# Patient Record
Sex: MALE | Race: WHITE | Employment: OTHER | URBAN - METROPOLITAN AREA
[De-identification: names, ages, dates, MRNs, and addresses within clinical notes are randomized per-mention and may not be internally consistent; named-entity substitution may affect disease eponyms.]

---

## 2020-06-02 ENCOUNTER — HOSPITAL ENCOUNTER (OUTPATIENT)
Dept: GENERAL RADIOLOGY | Age: 65
Discharge: HOME | End: 2020-06-02
Payer: MEDICARE

## 2020-06-02 DIAGNOSIS — M25.559 HIP PAIN: ICD-10-CM

## 2020-06-02 PROCEDURE — 73521 X-RAY EXAM HIPS BI 2 VIEWS: CPT

## 2020-12-09 ENCOUNTER — TRANSCRIBE ORDER (OUTPATIENT)
Dept: SCHEDULING | Age: 65
End: 2020-12-09

## 2020-12-09 DIAGNOSIS — N18.31 STAGE 3A CHRONIC KIDNEY DISEASE (HCC): Primary | ICD-10-CM

## 2020-12-15 ENCOUNTER — HOSPITAL ENCOUNTER (OUTPATIENT)
Dept: ULTRASOUND IMAGING | Age: 65
Discharge: HOME | End: 2020-12-15
Attending: FAMILY MEDICINE
Payer: MEDICARE

## 2020-12-15 DIAGNOSIS — N18.31 STAGE 3A CHRONIC KIDNEY DISEASE (HCC): ICD-10-CM

## 2020-12-15 PROCEDURE — 76770 US EXAM ABDO BACK WALL COMP: CPT

## 2021-06-16 ENCOUNTER — HOSPITAL ENCOUNTER (OUTPATIENT)
Dept: GENERAL RADIOLOGY | Age: 66
Discharge: HOME | End: 2021-06-16
Payer: MEDICARE

## 2021-06-16 ENCOUNTER — TRANSCRIBE ORDER (OUTPATIENT)
Dept: GENERAL RADIOLOGY | Age: 66
End: 2021-06-16

## 2021-06-16 DIAGNOSIS — R05.9 COUGH: Primary | ICD-10-CM

## 2021-06-16 DIAGNOSIS — R05.9 COUGH: ICD-10-CM

## 2021-06-16 PROCEDURE — 71046 X-RAY EXAM CHEST 2 VIEWS: CPT | Performed by: FAMILY MEDICINE

## 2022-07-22 ENCOUNTER — OFFICE VISIT (OUTPATIENT)
Dept: ENDOCRINOLOGY | Age: 67
End: 2022-07-22
Payer: MEDICARE

## 2022-07-22 VITALS
BODY MASS INDEX: 29.12 KG/M2 | HEART RATE: 62 BPM | WEIGHT: 181.2 LBS | SYSTOLIC BLOOD PRESSURE: 145 MMHG | DIASTOLIC BLOOD PRESSURE: 69 MMHG | HEIGHT: 66 IN

## 2022-07-22 DIAGNOSIS — E66.3 OVERWEIGHT (BMI 25.0-29.9): ICD-10-CM

## 2022-07-22 DIAGNOSIS — I10 PRIMARY HYPERTENSION: ICD-10-CM

## 2022-07-22 DIAGNOSIS — E11.65 TYPE 2 DIABETES MELLITUS WITH HYPERGLYCEMIA, WITHOUT LONG-TERM CURRENT USE OF INSULIN (HCC): Primary | ICD-10-CM

## 2022-07-22 DIAGNOSIS — E78.5 DYSLIPIDEMIA: ICD-10-CM

## 2022-07-22 PROCEDURE — 99204 OFFICE O/P NEW MOD 45 MIN: CPT | Performed by: GENERAL ACUTE CARE HOSPITAL

## 2022-07-22 PROCEDURE — 1123F ACP DISCUSS/DSCN MKR DOCD: CPT | Performed by: GENERAL ACUTE CARE HOSPITAL

## 2022-07-22 RX ORDER — PEN NEEDLE, DIABETIC 31 GX3/16"
NEEDLE, DISPOSABLE MISCELLANEOUS
Qty: 100 PEN NEEDLE | Refills: 3 | Status: SHIPPED | OUTPATIENT
Start: 2022-07-22 | End: 2022-10-21

## 2022-07-22 RX ORDER — ROSUVASTATIN CALCIUM 20 MG/1
20 TABLET, COATED ORAL DAILY
COMMUNITY
Start: 2022-06-06

## 2022-07-22 RX ORDER — TAMSULOSIN HYDROCHLORIDE 0.4 MG/1
CAPSULE ORAL
COMMUNITY
Start: 2022-07-13

## 2022-07-22 RX ORDER — ALLOPURINOL 300 MG/1
TABLET ORAL
COMMUNITY
Start: 2022-07-01

## 2022-07-22 RX ORDER — GUAIFENESIN 100 MG/5ML
81 LIQUID (ML) ORAL DAILY
COMMUNITY

## 2022-07-22 RX ORDER — GLIMEPIRIDE 2 MG/1
2 TABLET ORAL 2 TIMES DAILY
COMMUNITY
Start: 2022-06-16 | End: 2022-07-22 | Stop reason: ALTCHOICE

## 2022-07-22 RX ORDER — GLIPIZIDE 10 MG/1
10 TABLET ORAL 2 TIMES DAILY
Qty: 60 TABLET | Refills: 2 | Status: SHIPPED | OUTPATIENT
Start: 2022-07-22 | End: 2022-10-21 | Stop reason: SDUPTHER

## 2022-07-22 RX ORDER — LISINOPRIL 20 MG/1
40 TABLET ORAL DAILY
COMMUNITY

## 2022-07-22 RX ORDER — FENOFIBRATE 54 MG/1
TABLET ORAL
COMMUNITY
Start: 2022-07-01

## 2022-07-22 NOTE — PATIENT INSTRUCTIONS
Diabetes Education referral made: Call them for Appt  [facility] for [address] [facility], [address] [address]  Phone [phone]  Fax [phone]     Plan to repeat your diabetes eye exam in the near future. Please be sure to have the eye clinic / office fax us the report of your latest eye exam to our clinic to fax # [phone]. This is very important for us to keep track of any effect of diabetes on your vision as part of our wholesome diabetes care that we provide. INFORMATION FOR NEW DIABETES PATIENTS NOT ON INSULIN:    I would like to welcome you to our Diabetes & Endocrinology clinic. We want to do our best to help you take the best care of your diabetes. I would like for you to read this fact sheet which will have some important information for you regarding your treatment. What is my HbA1c (hemoglobin A1c) ? Blood sugar is very sticky and if left elevated for long enough, it will stick to just about everything in your body. This includes enzymes which can no longer function properly and the lining of your blood vessels which can get damaged and result in damaged organs. It also sticks to your hemoglobin when your red blood cells are being produced and measuring this can provide us with a good idea of what your blood sugar average has been over the past 3 months. Most of the time we aim for a HbA1c value of 7% but this can be different for certain people under certain circumstances. Why do I need to keep a glucose log ? It is not possible to properly make changes to your insulin dose unless we know what your glucose values are at home. Using your HbA1c we can only have a general idea of whether your glucose has been controlled or uncontrolled, but it will not inform us on your day-to-day and [id number] blood sugar control.  If you present to clinic without your glucose log, you may be wasting your visit rather than having a more meaningful visit since medication adjustments will be limited. What other things should I do to always be prepared ? Glucose tablets. Thats right, if you are on a medication that can potentially cause low blood sugars, you need to be prepared just in case since it can cause you to have very uncomfortable symptoms. Generally it is a good idea to keep some in your car, in your bedroom, and at work/school just in case. If your not sure if your diabetes medication can cause low blood sugar, be sure to ask your doctor. Diabetes ID. It is important for others to know that you are diabetic (especially if you are using in insulin) in case for some reason you are not able to communicate with others. This can happen if you pass out due to severely low blood sugar for example. IDs come as bracelets, necklaces, and dog tags. Check with your pharmacy about obtaining an ID and wear it wherever you go. I look forward to working with you,    Annie Sweeney MD   [address] [place] [address]    . ............................................................................................................................................ PLAN FOR TODAY    We will plan to make the following changes to your diabetes medications:  Plan is:  Stop Glimepride  Start Glipizid 10mg before breakfast and dinner (take 30 mins before the meal, start by taking half dose for dinner for few days and if morning sugars above 130 take full tablet before dinner)  If your sugars are running high on Glipizide 10mg twice daily and Jardiance 25mg daily then you can start Levemir insulin 10 units at bedtime. Please notify us if you are starting the insulin. It will be important to continue checking your glucose just as you did previously. I would like you at the very least to check you glucose during:     + AM fasting before breakfast   + Dinner time   + Bedtime   + Any other time that you are not feeling well. Always provide a glucose log that is completed at every visit so that we can review the results of your home glucose together. Without this, it is not possible to make accurate changes to your insulin doses. Diabetes and Meal Planning    Meal planning can be a key part of managing diabetes. Planning meals and snacks with the right balance of carbohydrate, protein, and fat can help you keep your blood sugar at the target level. You don't have to eat special foods. You can eat what your family eats, including sweets once in a while. But you do have to pay attention to how often you eat and how much you eat of certain foods. Your plate  The plate format is a simple way to help you manage how you eat. You plan meals by learning how much space each food should take on a plate. It can make it easier to keep your blood sugar level within your target range. It also helps you see if you're eating healthy portion sizes. To use the plate format, you put non-starchy vegetables on half your plate. Add lean protein foods, such as fish, lean meats and poultry, or soy products, on one-quarter of the plate. Put a grain or starchy vegetable (such as brown rice or a potato) on the final quarter of the plate. You can add a small piece of fruit and some low-fat or fat-free milk or yogurt, depending on your carbohydrate goal for each meal.  Make sure that you are not using an oversized plate. A 9-inch plate is best.    Carbohydrates  Carbohydrate raises blood sugar higher and more quickly than any other nutrient. It is found in desserts, breads and cereals, and fruit. It's also found in starchy vegetables such as potatoes and corn, grains such as rice and pasta, and milk and yogurt. You can help keep your blood sugar levels within your target range by planning how much carbohydrate to have at meals and snacks. The amount you need depends on several things.  These include your weight, how active you are, which diabetes medicines you take, and what your goals are for your blood sugar levels. An example of a carbohydrate counting plan is:  45 to 60 grams at each meal. That's about the same as 3 to 4 carbohydrate servings. 15 to 20 grams at each snack. That's about the same as 1 carbohydrate serving. The Nutrition Facts label on packaged foods tells you how much carbohydrate is in a serving of the food. First, look at the serving size on the food label. All of the nutrition information on a food label is based on that serving size. For foods that don't come with labels, such as fresh fruits and vegetables, you'll need a guide that lists carbohydrate in these foods. You may use an kate on your smart phone called [employer]. How can you plan healthy meals? Here are some tips to get started:  Plan your meals a week at a time. Don't forget to include snacks too. Use cookbooks or online recipes to plan several main meals. Plan some quick meals for busy nights. You also can double some recipes that freeze well. Then you can save half for other busy nights when you don't have time to cook. Make sure you have the ingredients you need for your recipes. If you're running low on basic items, put these items on your shopping list too. List foods that you use to make breakfasts, lunches, and snacks. List plenty of fruits and vegetables. Post this list on the refrigerator. Add to it as you think of more things you need. Take the list to the store to do your weekly shopping. Follow-up care is a key part of your treatment and safety. Be sure to make and go to all appointments, and call your doctor if you are having problems. It's also a good idea to know your test results and keep a list of the medicines you take. Food Tips    Back to basics:    When you have diabetes or pre-diabetes, as you know, your body has difficulty dealing with the sugar it absorbs from your meals.  An unrelated but very relevant term you may have heard before, quantitative easing, has a new meaning: By gradually reducing the load of sugars entering the body, you ease the stress on the body and allow it to catch up with the work it must do. This in turn will allow your body to work better. On top of this, remember one point, the more sugar stress your body has, the more you enter a state of glucose toxicity and this is a state where you become even more resistant. Thats right higher sugar = more resistance, not only to your own bodies attempt to fix the problem but also resistance to your medications. This is why medications work best when a proper diet is followed. Tip 1. Dont forget the protein. When you add a portion of meat or other low carb protein food in your meal, it provides healthy calories which contribute to reducing that feeling of hunger that drives you to eat what you dont want. Tip 2. Portions are a real thing. Before you eat, stop and look at your plate/table. Count how many items have sugars/carbs in them. A sandwich (bread) ? Zettie Brome ? Sweet drink ? Potatoe ? Pasta ? Rice ? You would be surprised when you become aware. Aim for a reasonable portion of carbs, and if you feel you absolutely cannot do this, at least start working toward this. 45-60 grams is usually more than enough in one meal. And YES, than includes the desert! Tip 3. Three meals per day, snack-free in between! Your body needs a break. Eating an adequate meal keeps you from getting hungry and reaching for a snack in between meals. Recall that most of the time, diabetic patients are not treating these snacks. Dont eat dinner late at night, but rather allow more overnight fasting time for your body to recover.  If you eat dinner at 8 PM, try 6 PM.  Sporadic eating is the opposite of what you need, and adjusting to a regular eating schedule such as this will not only be a great benefit to your body, but your medications will also tend to work better at keeping your diabetes under control. Tip 4. There is no best diet when it comes to weight loss. When comparing diets and outcomes, the main ingredient when searching for weight loss was calories ! Lower calories = better weight loss. Pick a healthy diet thats right for you, i.e. diabetes friendly, and evaluate your daily calorie intake. And of course this would be of no use without exercise. Calories in need calories out.    --------------------------------------------------------------------------------------------------------------------------------------------------------------------------------  Diabetes Dental Care  When you have diabetes, managing blood sugar levels and taking good care of your teeth and gums are both important. When blood sugar levels are high, there's a greater risk for Gum (periodontal) disease. Tooth decay. Fungal infections in the mouth, like thrush. Dry mouth. Keeping your blood sugar levels in your target range can help prevent problems with the teeth and gums. If you have any problems with your teeth or gums, it is important see your dentist.  How do you care for your teeth and gums when you have diabetes? Brush your teeth twice a day. Floss daily. Make sure to press the floss against your teeth and not your gums. Check each day for areas where your gums might be red or painful. Be sure to let your dentist know of any sores in your mouth. See your dentist regularly for professional cleaning of your teeth and to look for gum problems. Many dentists recommend getting checkups twice a year. Remind your dentist that you have diabetes before any work is done. Don't smoke or use smokeless tobacco.    --------------------------------------------------------------------------------------------------------------------------------------------------------------------------------  Diabetes Foot Care    When you have diabetes, your feet need extra care and attention.  Diabetes can damage the nerve endings and blood vessels in your feet, making you less likely to notice when your feet are injured. Diabetes also limits your body's ability to fight infection. If you get a minor foot injury, it could become an ulcer or a serious infection. With good foot care, you can prevent most of these problems. Caring for your feet can be quick and easy. Most of the care can be done when you are bathing or getting ready for bed. Keep your blood sugar close to normal by watching what and how much you eat, monitoring blood sugar, taking medicines if prescribed, and getting regular exercise. Do not smoke. Smoking affects blood flow and can make foot problems worse. Eat a diet that is low in fats. High fat intake can cause fat to build up in your blood vessels and decrease blood flow. Inspect your feet daily for blisters, cuts, cracks, or sores. If you cannot see well, use a mirror or have someone help you. Take care of your feet:  Wash your feet every day. Use warm (not hot) water. Check the water temperature with your wrists or other part of your body, not your feet. Dry your feet well. If the skin on your feet stays moist, bacteria or a fungus can grow, which can lead to infection. Use moisturizing skin cream to keep the skin on your feet soft and prevent calluses and cracks. Stop using any cream that causes a rash. Clean underneath your toenails carefully. Do not use a sharp object to clean underneath your toenails. Change socks daily. Look inside your shoes every day for things like gravel or torn linings, which could cause blisters or sores. Buy shoes that fit well but not too tightly to prevent bunions and blisters. Shoes should be flexible and breathable but prevent from injury. Do not go barefoot, especially at night, to prevent injury. Do not try to treat an early foot problem at home. Home remedies or treatments that you can buy without a prescription (such as corn removers) can be harmful.   Seek immediate help if:   You have a foot sore, an ulcer or break in the skin that is not healing after 4 days, bleeding corns or calluses, or an ingrown toenail. You have blue or black areas. You have peeling skin or tiny blisters between your toes or cracking or oozing of the skin. You have a fever for more than 24 hours and a foot sore. You have new numbness or tingling in your feet that does not go away after you move your feet or change positions. You have new unexplained or unusual swelling of the foot or ankle.

## 2022-07-22 NOTE — PROGRESS NOTES
REFERRED BY: Fabrizio Pro MD     REASON:  Uncontrolled type 2 diabetes mellitus    CHIEF COMPLAINT: evaluation of type 2 diabetes mellitus    HISTORY OF PRESENT ILLNESS:   Luke Beth is a 79 y.o. male with a PMHx as noted below who presents for evaluation of uncontrolled type 2 diabetes.     Diabetes History:  Diabetes was diagnosed: 2002 officially diagnosed,   1999 PE in the hospital and found with impaired sugars  Family History of diabetes is Positive father DM 2  Last A1c prior to initial visit was: 8.1%    Current Home Regimen:  Glimepride 2mg 2x/day started on march   Jardiance 25mg daily 6 weeks ago    Januvia stopped it many yrs ago  Glyburide was on it before it  Metformin d/c 2 yrs ago    Review of home glucose:  -133 ( the lowest during the day)  Dinner before 5-6PM >180 - 250, one reading of 330  Bedtime 120-220    Hypoglycemia:no    Diet: trying to do keto <20g/day before, 6 weeks ago started eating 40-60g/meal  -breakfast 2 black coffee cups, then 1 hr later eats eggs 2x w/ w/o meat, wheat toast, oatmeal 3x/week, occass pancakes with blueberries  -lunch roast beef sandwiches, bean soup  -dinner whatever his wife fixes, chicken tenders over salad, 2 hotdogs with no buns with french fries with apple sauce, 1 starchy veggie, broccoli,   -snacks: cheese, nuts, fresh watermelon for dessert   -michi: avg 4-5 liters of water, 2x/month soda    Physical Activity:  -sedentary lifestyle, in summer more activity      Complications:  Retinopathy:Yes  Nephropathy:Yes  Neuropathy:Yes  MI or CVA:No    Last Ophthalm:1 yr and half    Last Podiatry:none    On a Statin:Yes  On an ACEI/ARB:Yes  On Aspirin:Yes  Smoker:Yes    06/09/2022  Fasting blood glucose 193  Cr 1.53  GFR 49  03/2022  LDL 62    Chol 148  HDL 46    Review of most recent diabetes-related labs:  No results found for: HBA1C, OPZ7DIUT, DSC3THSZ, CHOL, LDLC, LDL, LDLCEXT, GFRAA, GFRNA, CREATEXT, MCACR, MALBEXT, 020582, GADLT, INSUL, CPEPLT, TSH, VITD3, B12LT, [id number], TSHEXT  Lab Key:  [id number] = IA-2 pancreatic islet cell autoantibody  CPEPL = C-peptide level  :EXT = External Lab  GADLT = RAGHU-65 autoantibody   INSUL = Insulin level  MCACR (or MALBEXT) = Urine Microalbumin (or External UM)  B12LT = B12 level    PAST MEDICAL/SURGICAL HISTORY:   No past medical history on file. No past surgical history on file. ALLERGIES:   Not on File    MEDICATIONS ON ADMISSION:     Current Outpatient Medications:     allopurinoL (ZYLOPRIM) 300 mg tablet, TAKE 1 TABLET BY MOUTH ONCE DAILY FOR 90 DAYS, Disp: , Rfl:     glimepiride (AMARYL) 2 mg tablet, Take 2 mg by mouth two (2) times a day., Disp: , Rfl:     fenofibrate (LOFIBRA) 54 mg tablet, TAKE 1 TABLET BY MOUTH ONCE DAILY, MUST GET LABS DONE FOR MORE REFILLS, Disp: , Rfl:     rosuvastatin (CRESTOR) 20 mg tablet, Take 20 mg by mouth in the morning., Disp: , Rfl:     lisinopriL (PRINIVIL, ZESTRIL) 20 mg tablet, Take  by mouth two (2) times a day., Disp: , Rfl:     MULTIVITAMIN PO, Take  by mouth., Disp: , Rfl:     aspirin 81 mg chewable tablet, Take 81 mg by mouth in the morning., Disp: , Rfl:     empagliflozin (Jardiance) 25 mg tablet, Take  by mouth daily. , Disp: , Rfl:     tamsulosin (FLOMAX) 0.4 mg capsule, TAKE 1 CAPSULE BY MOUTH TWICE DAILY 30 MINUTES AFTER A MEAL, Disp: , Rfl:     SOCIAL HISTORY:   Social History     Socioeconomic History    Marital status:      Spouse name: Not on file    Number of children: Not on file    Years of education: Not on file    Highest education level: Not on file   Occupational History    Not on file   Tobacco Use    Smoking status: Not on file     Passive exposure: Never    Smokeless tobacco: Never   Substance and Sexual Activity    Alcohol use: Yes    Drug use: Never    Sexual activity: Not on file   Other Topics Concern    Not on file   Social History Narrative    Not on file     Social Determinants of Health     Financial Resource Strain: Not on file Food Insecurity: Not on file   Transportation Needs: Not on file   Physical Activity: Not on file   Stress: Not on file   Social Connections: Not on file   Intimate Partner Violence: Not on file   Housing Stability: Not on file       FAMILY HISTORY:  No family history on file. REVIEW OF SYSTEMS: Complete ROS assessed and noted for that which is described above, all else are negative.     CONSTITUTIONAL: no fevers, chills, weight loss  EYES: no blurry vision or double vision  CARDIOVASCULAR: no chest pain or palpitations  RESPIRATORY: no cough or shortness of breath  GASTROINTESTINAL: no dysphagia or abdominal pain  MUSCULOSKELETAL: no joint pains or weakness  SKIN: no rashes  NEUROLOGICAL: no numbness, tingling, or headaches  PSYCHIATRIC: no depression or anxiety  ENDOCRINE: no heat or cold intolerance, no polyuria or polydipsia      PHYSICAL EXAMINATION:  VITAL SIGNS:  Visit Vitals  BP (!) 145/69   Pulse 62   Ht 5' 6\" (1.676 m)   Wt 181 lb 3.2 oz (82.2 kg)   BMI 29.25 kg/m²     Last 3 Recorded Weights in this Encounter    07/22/22 0931   Weight: 181 lb 3.2 oz (82.2 kg)        GENERAL: NCAT, Sitting comfortably, NAD  EYES: EOMI, non-icteric, no proptosis  Ear/Nose/Throat: NCAT, no inflammation, no masses  LYMPH NODES: No LAD  CARDIOVASCULAR: S1 S2, RRR, No murmur, 2+ radial pulses  RESPIRATORY: CTA b/l, no wheeze/rales  GASTROINTESTINAL: NT, ND  MUSCULOSKELETAL: Normal ROM, no atrophy  SKIN: warm, no edema/rash/ or other skin changes  NEUROLOGIC: 5/5 power all extremities, no tremor, AAOx3  PSYCHIATRIC: Normal affect, Normal insight and judgement    Diabetic foot exam:     Left Foot:   Visual Exam: normal    Pulse DP: 2+ (normal)   Filament test: normal sensation    Vibratory sensation: Vibratory sensation: normal       Right Foot:   Visual Exam: normal    Pulse DP: 2+ (normal)   Filament test: normal sensation    Vibratory sensation: Vibratory sensation: normal      REVIEW OF LABORATORY AND RADIOLOGY DATA: Labs and documentation have been reviewed as described above. ASSESSMENT AND PLAN:   Frandy Hassan is a 79 y.o. male with a PMHx as noted above who presents for evaluation of uncontrolled type 2 diabetes. Problems:  Type 2 diabetes Uncontrolled  Hyperlipidemia  Hypertension    We had the pleasure of reviewing together the basics of diabetes including basic pathophysiology and diabetes care. We further discussed the importance of checking home glucose regularly and takin all of their scheduled medications in order to have the best possible outcome. I was able to answer any questions they had in clinic today and they are invited to reach me if they have any further questions. Based upon our discussion together today we have decided to make the following changes: Today we spent time also discussing the goals of diabetes treatment in the elderly population. Note that avoiding hypoglycemia becomes an increasingly important goal with consideration to the increased risk of falls and associated fractures, among other accidents / trauma that can result from a low blood sugar. In this setting, customizing A1c goals is always a good idea, and consideration of the patients independence and functional status is always important when considering their diabetes regimen and the best way to approach treatment decisions today.      PLAN  Type 2 Diabetes  Medications:   Given suboptimal sugar control and a1c above target of 7%  Jardiace was recently started    Plan:  Continue Jardiance 25mg daily  Stop Glimepride  Start Glipizide 10mg before breakfast and dinner (take 30 mins before the meal, start by taking half dose for dinner for few days and if morning sugars above 130 take full tablet before dinner)  Advised if his BS running high (BS >200) on Glipizide 10mg twice daily and Jardiance 25mg daily then to start Levemir insulin 10 units at bedtime (advised not to  the prescription til he needs it)  GLP-1 agonists not option per patient due to cost    Advised to check glucose 2x/day  Provided with glucose log sheets for later review. Referred for DM educ  HTN: BP above target, advised DELUNA, and better dietary habits, no changes today, on lisinopril 20mg, jardiance will also help control BP. HLD: Fasting lipids to be reviewed, LDL 62 on rosuva 20, cont same  Overweight: discussed lifestyle modif  RTC 3 months    We discussed the expected course, resolution and complications of the diagnosis(es) in detail. Medication risks, benefits, costs, interactions, and alternatives were discussed as indicated. I advised Liliana Sifuentes to contact the office if him condition worsens, changes or fails to improve as anticipated. Patient expressed understanding with the diagnosis(es) and plan. MD Man[facility]    Please see patient instructions.

## 2022-07-22 NOTE — LETTER
7/30/2022    Patient: Esperanza Aguilar   YOB: 1955   Date of Visit: 7/22/2022     Priyank Dickson MD  [address] [place] [id number]  Via Fax: [phone]    Dear Priyank Dickson MD,      Thank you for referring Mr. Azam Mart to [address] [place] [address] for evaluation. My notes for this consultation are attached. If you have questions, please do not hesitate to call me. I look forward to following your patient along with you.       Sincerely,    Lester Perdomo MD

## 2022-07-30 PROBLEM — E11.65 TYPE 2 DIABETES MELLITUS WITH HYPERGLYCEMIA, WITHOUT LONG-TERM CURRENT USE OF INSULIN (HCC): Status: ACTIVE | Noted: 2022-07-30

## 2022-07-30 PROBLEM — E78.5 DYSLIPIDEMIA: Status: ACTIVE | Noted: 2022-07-30

## 2022-07-30 PROBLEM — I10 PRIMARY HYPERTENSION: Status: ACTIVE | Noted: 2022-07-30

## 2022-07-30 PROBLEM — E66.3 OVERWEIGHT (BMI 25.0-29.9): Status: ACTIVE | Noted: 2022-07-30

## 2022-10-17 DIAGNOSIS — E11.65 TYPE 2 DIABETES MELLITUS WITH HYPERGLYCEMIA, WITHOUT LONG-TERM CURRENT USE OF INSULIN (HCC): ICD-10-CM

## 2022-10-21 ENCOUNTER — OFFICE VISIT (OUTPATIENT)
Dept: ENDOCRINOLOGY | Age: 67
End: 2022-10-21
Payer: MEDICARE

## 2022-10-21 VITALS
DIASTOLIC BLOOD PRESSURE: 91 MMHG | WEIGHT: 175.2 LBS | SYSTOLIC BLOOD PRESSURE: 134 MMHG | BODY MASS INDEX: 28.16 KG/M2 | HEIGHT: 66 IN | HEART RATE: 78 BPM

## 2022-10-21 DIAGNOSIS — E11.65 TYPE 2 DIABETES MELLITUS WITH HYPERGLYCEMIA, WITHOUT LONG-TERM CURRENT USE OF INSULIN (HCC): ICD-10-CM

## 2022-10-21 LAB — HBA1C MFR BLD HPLC: 7.1 %

## 2022-10-21 PROCEDURE — 83036 HEMOGLOBIN GLYCOSYLATED A1C: CPT | Performed by: GENERAL ACUTE CARE HOSPITAL

## 2022-10-21 PROCEDURE — 99213 OFFICE O/P EST LOW 20 MIN: CPT | Performed by: GENERAL ACUTE CARE HOSPITAL

## 2022-10-21 PROCEDURE — 1123F ACP DISCUSS/DSCN MKR DOCD: CPT | Performed by: GENERAL ACUTE CARE HOSPITAL

## 2022-10-21 RX ORDER — GLIPIZIDE 10 MG/1
10 TABLET ORAL 2 TIMES DAILY
Qty: 180 TABLET | Refills: 3 | Status: SHIPPED | OUTPATIENT
Start: 2022-10-21

## 2022-10-21 RX ORDER — GLIPIZIDE 10 MG/1
TABLET ORAL
Qty: 180 TABLET | Refills: 3 | OUTPATIENT
Start: 2022-10-21

## 2022-10-21 NOTE — PROGRESS NOTES
REFERRED BY: Wilfredo De Paz MD     REASON:  Uncontrolled type 2 diabetes mellitus    CHIEF COMPLAINT: evaluation of type 2 diabetes mellitus    HISTORY OF PRESENT ILLNESS:   Joie Buenrostro is a 79 y.o. male with a PMHx as noted below who presents for evaluation of uncontrolled type 2 diabetes. In the last visit we stopped Glimepiride 2mg BID and started Glipizide 10mg BID and to take Jardiance 25mg daily. Patient has been monitoring his diet well and restricting himself average 40g carbs per meal, no hypoglycemia episodes.      Diabetes History:  Diabetes was diagnosed: 2002 officially diagnosed,   1999 PE in the hospital and found with impaired sugars  Family History of diabetes is Positive father DM 2  Last A1c prior to initial visit was: 8.1% 07/2022  7.1  10/21/2022    Current Home Regimen:  Glipizide 10mg BID (was taking after meals)  Jardiance 25mg daily    Januvia stopped it many yrs ago  Glyburide was on it before it  Metformin d/c 2 yrs ago    Review of home glucose:  See scanned      Hypoglycemia:no    Diet: eating 40-50g/meal  -breakfast 2 black coffee cups, then 1 hr later eats eggs 2x w/ w/o meat, wheat toast, oatmeal 3x/week, occass pancakes with blueberries  -lunch roast beef sandwiches, bean soup  -dinner whatever his wife fixes, chicken tenders over salad, 2 hotdogs with no buns with french fries with apple sauce, 1 starchy veggie, broccoli,   -snacks: cheese, nuts, fresh watermelon for dessert   -michi: avg 4-5 liters of water, 2x/month soda    Physical Activity:  -sedentary lifestyle, in summer more activity    Complications:  Retinopathy:Yes  Nephropathy:Yes  Neuropathy:Yes  MI or CVA:No    Last Ophthalm: last year    Last Podiatry:none    On a Statin:Yes  On an ACEI/ARB:Yes  On Aspirin:Yes  Smoker:No    06/09/2022  Fasting blood glucose 193  Cr 1.53  GFR 49  03/2022  LDL 62    Chol 148  HDL 46    Review of most recent diabetes-related labs:  No results found for: HBA1C, [id number], [id number], CHOL, LDLC, LDL, LDLCEXT, GFRAA, GFRNA, CREATEXT, MCACR, MALBEXT, [id number], GADLT, INSUL, CPEPLT, TSH, VITD3, B12LT, AOO0PBPS, TSHEXT, ZXP1GYEI, TSHEXT  Lab Key:  [id number] = IA-2 pancreatic islet cell autoantibody  CPEPL = C-peptide level  :EXT = External Lab  GADLT = RAGHU-65 autoantibody   INSUL = Insulin level  MCACR (or MALBEXT) = Urine Microalbumin (or External UM)  B12LT = B12 level    PAST MEDICAL/SURGICAL HISTORY:   No past medical history on file. No past surgical history on file. ALLERGIES:   Not on File    MEDICATIONS ON ADMISSION:     Current Outpatient Medications:     OTHER,NON-FORMULARY,, Prostate supplement one tab daily, Disp: , Rfl:     allopurinoL (ZYLOPRIM) 300 mg tablet, TAKE 1 TABLET BY MOUTH ONCE DAILY FOR 90 DAYS, Disp: , Rfl:     fenofibrate (LOFIBRA) 54 mg tablet, TAKE 1 TABLET BY MOUTH ONCE DAILY, MUST GET LABS DONE FOR MORE REFILLS, Disp: , Rfl:     rosuvastatin (CRESTOR) 20 mg tablet, Take 20 mg by mouth in the morning., Disp: , Rfl:     tamsulosin (FLOMAX) 0.4 mg capsule, TAKE 1 CAPSULE BY MOUTH TWICE DAILY 30 MINUTES AFTER A MEAL, Disp: , Rfl:     lisinopriL (PRINIVIL, ZESTRIL) 20 mg tablet, Take 40 mg by mouth daily. , Disp: , Rfl:     MULTIVITAMIN PO, Take  by mouth., Disp: , Rfl:     aspirin 81 mg chewable tablet, Take 81 mg by mouth in the morning., Disp: , Rfl:     glipiZIDE (GLUCOTROL) 10 mg tablet, Take 1 Tablet by mouth two (2) times a day., Disp: 60 Tablet, Rfl: 2    empagliflozin (Jardiance) 25 mg tablet, Take 1 Tablet by mouth in the morning., Disp: 90 Tablet, Rfl: 1    insulin detemir U-100 (LEVEMIR FLEXTOUCH) 100 unit/mL (3 mL) inpn, 15 Units by SubCUTAneous route nightly.  (Patient not taking: Reported on 10/21/2022), Disp: 15 mL, Rfl: 5    Insulin Needles, Disposable, (Leigh Pen Needle) 32 gauge x 5/32\" ndle, For use daily with Insulin Pen Dx E11.9 (Patient not taking: Reported on 10/21/2022), Disp: 100 Pen Needle, Rfl: 3    SOCIAL HISTORY:   Social History Socioeconomic History    Marital status:      Spouse name: Not on file    Number of children: Not on file    Years of education: Not on file    Highest education level: Not on file   Occupational History    Not on file   Tobacco Use    Smoking status: Never     Passive exposure: Never    Smokeless tobacco: Never   Substance and Sexual Activity    Alcohol use: Yes    Drug use: Never    Sexual activity: Not on file   Other Topics Concern    Not on file   Social History Narrative    Not on file     Social Determinants of Health     Financial Resource Strain: Not on file   Food Insecurity: Not on file   Transportation Needs: Not on file   Physical Activity: Not on file   Stress: Not on file   Social Connections: Not on file   Intimate Partner Violence: Not on file   Housing Stability: Not on file       FAMILY HISTORY:  No family history on file. REVIEW OF SYSTEMS: Complete ROS assessed and noted for that which is described above, all else are negative.     CONSTITUTIONAL: no fevers, chills, weight loss  EYES: no blurry vision or double vision  CARDIOVASCULAR: no chest pain or palpitations  RESPIRATORY: no cough or shortness of breath  GASTROINTESTINAL: no dysphagia or abdominal pain  MUSCULOSKELETAL: no joint pains or weakness  SKIN: no rashes  NEUROLOGICAL: no numbness, tingling, or headaches  PSYCHIATRIC: no depression or anxiety  ENDOCRINE: no heat or cold intolerance, no polyuria or polydipsia      PHYSICAL EXAMINATION:  VITAL SIGNS:  Visit Vitals  BP (!) 150/77   Pulse 78   Ht 5' 6\" (1.676 m)   Wt 175 lb 3.2 oz (79.5 kg)   BMI 28.28 kg/m²     Last 3 Recorded Weights in this Encounter    10/21/22 1127   Weight: 175 lb 3.2 oz (79.5 kg)        GENERAL: NCAT, Sitting comfortably, NAD  EYES: EOMI, non-icteric, no proptosis  Ear/Nose/Throat: NCAT, no inflammation, no masses  LYMPH NODES: No LAD  CARDIOVASCULAR: S1 S2, RRR, No murmur, 2+ radial pulses  RESPIRATORY: CTA b/l, no wheeze/rales  GASTROINTESTINAL: NT, ND  MUSCULOSKELETAL: Normal ROM, no atrophy  SKIN: warm, no edema/rash/ or other skin changes  NEUROLOGIC: 5/5 power all extremities, no tremor, AAOx3  PSYCHIATRIC: Normal affect, Normal insight and judgement    Diabetic foot exam 07/2022:     Left Foot:   Visual Exam: normal    Pulse DP: 2+ (normal)   Filament test: normal sensation    Vibratory sensation: Vibratory sensation: normal       Right Foot:   Visual Exam: normal    Pulse DP: 2+ (normal)   Filament test: normal sensation    Vibratory sensation: Vibratory sensation: normal      REVIEW OF LABORATORY AND RADIOLOGY DATA:   Labs and documentation have been reviewed as described above. ASSESSMENT AND PLAN:   Елена Berg is a 79 y.o. male with a PMHx as noted above who presents for evaluation of uncontrolled type 2 diabetes. Problems:  Type 2 diabetes Uncontrolled  Hyperlipidemia  Hypertension    We spent time today discussing preferred dietary changes and goals which will benefit their diabetes treatment. We noted the need to have an awareness of the amount of carbohydrates consumed in each meal, which includes the beverage, main course, and desert. We noted the benefits of eating 3 meals per day with appropriate portions. We also discussed the importance of getting blood sugars back down in a timely fashion following meals to reduce what is known as post-prandial hyperglycemia, and in this context we also noted the benefits of exercise/walking for 20 minutes, one hour after eating dinner each evening to help reset their blood sugar before bedtime. Patient demonstrated their understanding of these concepts.       PLAN  Type 2 Diabetes  Medications:   Given suboptimal sugar control and a1c above target of 7%  Jardiace was recently started    Plan:  Continue Jardiance 25mg daily  Continue Glipizide 10mg BID (advised to take it 30mins before breakfast and dinner)    GLP-1 agonists not option per patient due to cost    Advised to check glucose 1x/day  Provided with glucose log sheets for later review. Referred for DM educ, patient states would be difficult for him  to go, advised to do so in the future    HTN: BP diastolic above target, advised DELUNA, and better dietary habits, no changes today, on lisinopril 20mg, BP management deferred to nephro    HLD: Fasting lipids to be reviewed, LDL 62 on rosuva 20, fenofibrate dose reduced per patient by his nephrologist, following with PCP for that  Overweight: discussed lifestyle modif, he lost 6 lbs since his last visit    RTC 3 months    We discussed the expected course, resolution and complications of the diagnosis(es) in detail. Medication risks, benefits, costs, interactions, and alternatives were discussed as indicated. I advised Nelda Has to contact the office if him condition worsens, changes or fails to improve as anticipated. Patient expressed understanding with the diagnosis(es) and plan. MD [facility]    Please see patient instructions.

## 2022-10-21 NOTE — PATIENT INSTRUCTIONS
PLAN FOR TODAY    Plan is:  Continue Jardiance 25mg daily and Glipizide 10mg before breakfast and dinner (take 30 mins before the breakfast and dinner)    I would like you at the to check you glucose fasting

## 2022-10-21 NOTE — ADDENDUM NOTE
Addended by: Elizabet Gill on: 10/21/2022 01:25 PM     Modules accepted: Orders W Plasty Text: The lesion was extirpated to the level of the fat with a #15 scalpel blade.  Given the location of the defect, shape of the defect and the proximity to free margins a W-plasty was deemed most appropriate for repair.  Using a sterile surgical marker, the appropriate transposition arms of the W-plasty were drawn incorporating the defect and placing the expected incisions within the relaxed skin tension lines where possible.    The area thus outlined was incised deep to adipose tissue with a #15 scalpel blade.  The skin margins were undermined to an appropriate distance in all directions utilizing iris scissors.  The opposing transposition arms were then transposed into place in opposite direction and anchored with interrupted buried subcutaneous sutures.

## 2022-10-21 NOTE — LETTER
10/21/2022    Patient: Miguel Mckenna   YOB: 1955   Date of Visit: 10/21/2022     Ling Galvan MD  [address] [place] [id number]  Via Fax: [phone]    Dear Ling Galvan MD,      Thank you for referring Mr. Jarod Canchola to [address] [place] [address] for evaluation. My notes for this consultation are attached. If you have questions, please do not hesitate to call me. I look forward to following your patient along with you.       Sincerely,    Maximiliano Vickers MD

## 2023-01-25 ENCOUNTER — VIRTUAL VISIT (OUTPATIENT)
Dept: ENDOCRINOLOGY | Age: 68
End: 2023-01-25
Payer: MEDICARE

## 2023-01-25 DIAGNOSIS — E78.2 MIXED HYPERLIPIDEMIA: Primary | ICD-10-CM

## 2023-01-25 DIAGNOSIS — E66.3 OVERWEIGHT (BMI 25.0-29.9): ICD-10-CM

## 2023-01-25 DIAGNOSIS — E11.65 TYPE 2 DIABETES MELLITUS WITH HYPERGLYCEMIA, WITHOUT LONG-TERM CURRENT USE OF INSULIN (HCC): ICD-10-CM

## 2023-01-25 RX ORDER — AMLODIPINE BESYLATE 5 MG/1
5 TABLET ORAL DAILY
COMMUNITY

## 2023-01-25 RX ORDER — FENOFIBRATE 160 MG/1
160 TABLET ORAL DAILY
COMMUNITY

## 2023-01-25 NOTE — PROGRESS NOTES
Maria Fernanda Espinosa  was evaluated through a synchronous (real-time) audio-video encounter. The patient (or guardian if applicable) is aware that this is a billable service, which includes applicable co-pays. Verbal consent to proceed has been obtained. The visit was conducted pursuant to the emergency declaration under the [facility], [address] [place] [address] authority and the [employer] Act. Patient identification was verified, and a caregiver was present when appropriate. The patient was located at home in a state where the provider was licensed to provide care. REFERRED BY: Desmond Lopez MD     REASON:  Uncontrolled type 2 diabetes mellitus    CHIEF COMPLAINT: evaluation of type 2 diabetes mellitus    HISTORY OF PRESENT ILLNESS:   Maria Fernanda Espinosa is a 79 y.o. male with a PMHx as noted below who presents for evaluation of uncontrolled type 2 diabetes. In the last visit we stopped Glimepiride 2mg BID and started Glipizide 10mg BID and to take Jardiance 25mg daily. Patient has been monitoring his diet well and restricting himself average 40g carbs per meal, no hypoglycemia episodes.      Med change started on amlodipine    Diabetes History:  Diabetes was diagnosed: 2002 officially diagnosed,   1999 PE in the hospital and found with impaired sugars  Family History of diabetes is Positive father DM 2  Last A1c: 8.1% 07/2022  7.1  10/21/2022,  in mid Dec 2022 7.3%    Current Home Regimen:  Glipizide 10mg BID (was taking after meals)  Jardiance 25mg daily    Januvia stopped it many yrs ago  Glyburide was on it before it  Metformin d/c 2 yrs ago    Review of home glucose:  AM: 80s, 101, 110  Goes up to 200s after meals sometimes    Hypoglycemia:no    Diet: eating 40-50g/meal  -breakfast 2 black coffee cups, then 1 hr later eats eggs 2x w/ w/o meat, wheat toast, oatmeal 3x/week, occass pancakes with blueberries  -lunch roast beef sandwiches, bean soup  -dinner whatever his wife fixes, chicken tenders over salad, 2 hotdogs with no buns with french fries with apple sauce, 1 starchy veggie, broccoli,   -snacks: cheese, nuts, fresh watermelon for dessert   -michi: avg 4-5 liters of water, 2x/month soda    Physical Activity:  -sedentary lifestyle, in summer more activity    Complications:  Retinopathy:Yes  Nephropathy:Yes  Neuropathy:Yes  MI or CVA:No    Last Ophthalm: last year, is due    Last Podiatry:none, denies any new issues    On a Statin:Yes  On an ACEI/ARB:Yes  On Aspirin:Yes  Smoker:No    06/09/2022  Fasting blood glucose 193  Cr 1.53  GFR 49  03/2022  LDL 62    Chol 148  HDL 46    Review of most recent diabetes-related labs:  Lab Results   Component Value Date    [id number] 7.1 10/21/2022     Lab Key:  [id number] = IA-2 pancreatic islet cell autoantibody  CPEPL = C-peptide level  :EXT = External Lab  GADLT = RAGHU-65 autoantibody   INSUL = Insulin level  MCACR (or MALBEXT) = Urine Microalbumin (or External UM)  B12LT = B12 level    PAST MEDICAL/SURGICAL HISTORY:   No past medical history on file. No past surgical history on file. ALLERGIES:   Not on File    MEDICATIONS ON ADMISSION:     Current Outpatient Medications:     fenofibrate (LOFIBRA) 160 mg tablet, Take 160 mg by mouth daily. , Disp: , Rfl:     amLODIPine (NORVASC) 5 mg tablet, Take 5 mg by mouth daily. , Disp: , Rfl:     OTHER,NON-FORMULARY,, Prostate supplement one tab daily, Disp: , Rfl:     empagliflozin (Jardiance) 25 mg tablet, Take 1 Tablet by mouth daily. , Disp: 90 Tablet, Rfl: 1    glipiZIDE (GLUCOTROL) 10 mg tablet, Take 1 Tablet by mouth two (2) times a day., Disp: 180 Tablet, Rfl: 3    allopurinoL (ZYLOPRIM) 300 mg tablet, TAKE 1 TABLET BY MOUTH ONCE DAILY FOR 90 DAYS, Disp: , Rfl:     rosuvastatin (CRESTOR) 20 mg tablet, Take 20 mg by mouth in the morning., Disp: , Rfl:     tamsulosin (FLOMAX) 0.4 mg capsule, TAKE 1 CAPSULE BY MOUTH TWICE DAILY 30 MINUTES AFTER A MEAL, Disp: , Rfl:     lisinopriL (PRINIVIL, ZESTRIL) 20 mg tablet, Take 40 mg by mouth daily. , Disp: , Rfl:     MULTIVITAMIN PO, Take  by mouth., Disp: , Rfl:     aspirin 81 mg chewable tablet, Take 81 mg by mouth in the morning., Disp: , Rfl:     fenofibrate (LOFIBRA) 54 mg tablet, TAKE 1 TABLET BY MOUTH ONCE DAILY, MUST GET LABS DONE FOR MORE REFILLS (Patient not taking: Reported on 1/25/2023), Disp: , Rfl:     SOCIAL HISTORY:   Social History     Socioeconomic History    Marital status:      Spouse name: Not on file    Number of children: Not on file    Years of education: Not on file    Highest education level: Not on file   Occupational History    Not on file   Tobacco Use    Smoking status: Never     Passive exposure: Never    Smokeless tobacco: Never   Substance and Sexual Activity    Alcohol use: Yes    Drug use: Never    Sexual activity: Not on file   Other Topics Concern    Not on file   Social History Narrative    Not on file     Social Determinants of Health     Financial Resource Strain: Not on file   Food Insecurity: Not on file   Transportation Needs: Not on file   Physical Activity: Not on file   Stress: Not on file   Social Connections: Not on file   Intimate Partner Violence: Not on file   Housing Stability: Not on file       FAMILY HISTORY:  No family history on file. REVIEW OF SYSTEMS: Complete ROS assessed and noted for that which is described above, all else are negative.     CONSTITUTIONAL: no fevers, chills, weight loss  EYES: no blurry vision or double vision  CARDIOVASCULAR: no chest pain or palpitations  RESPIRATORY: no cough or shortness of breath  GASTROINTESTINAL: no dysphagia or abdominal pain  MUSCULOSKELETAL: no joint pains or weakness  SKIN: no rashes  NEUROLOGICAL: no numbness, tingling, or headaches  PSYCHIATRIC: no depression or anxiety  ENDOCRINE: no heat or cold intolerance, no polyuria or polydipsia      PHYSICAL EXAMINATION:  Telemedicine Visit    GENERAL: NCAT, Appears well nourished  EYES: EOMI, non-icteric, no proptosis  Ear/Nose/Throat: NCAT, no visible inflammation or masses  CARDIOVASCULAR: no cyanosis, no visible JVD  RESPIRATORY: comfortable respirations observed, no cyanosis  MUSCULOSKELETAL: Normal ROM of upper extremities observed  SKIN: No edema, rash, or other significant changes observed  NEUROLOGIC:  AAOx3  PSYCHIATRIC: Normal affect, Normal insight and judgement     Diabetic foot exam 07/2022:     Left Foot:   Visual Exam: normal    Pulse DP: 2+ (normal)   Filament test: normal sensation    Vibratory sensation: Vibratory sensation: normal       Right Foot:   Visual Exam: normal    Pulse DP: 2+ (normal)   Filament test: normal sensation    Vibratory sensation: Vibratory sensation: normal      REVIEW OF LABORATORY AND RADIOLOGY DATA:   Labs and documentation have been reviewed as described above. ASSESSMENT AND PLAN:   Sharon Quesada is a 79 y.o. male with a PMHx as noted above who presents for evaluation of uncontrolled type 2 diabetes. Problems:  Type 2 diabetes Uncontrolled  Hyperlipidemia  Hypertension    We spent time today discussing preferred dietary changes and goals which will benefit their diabetes treatment. We noted the need to have an awareness of the amount of carbohydrates consumed in each meal, which includes the beverage, main course, and desert. We noted the benefits of eating 3 meals per day with appropriate portions. Patient demonstrated their understanding of these concepts. PLAN  Type 2 Diabetes  Medications:   Given suboptimal sugar control and a1c above target of 7%    Plan:  Continue the same  Continue Jardiance 25mg daily  Continue Glipizide 10mg BID (advised to take it 30mins before breakfast and dinner)    GLP-1 agonists not option per patient due to cost    Advised to check glucose 1x/day  Provided with glucose log sheets for later review.   Referred for DM educ, patient states would be difficult for him  to go, advised to do so in the future    HTN: telehealth appt, on lisinopril 20mg, BP management deferred to nephro    HLD: Fasting lipids to be reviewed, LDL 62 on rosuva 20, fenofibrate dose reduced per patient by his nephrologist, following with PCP for that  Overweight: discussed lifestyle modif, he lost 6 lbs since his last visit  03/2022  LDL 62    Chol 148  HDL 46    Advised to have recent labs faxed to our office from PCP or to bring a copy in the upcoming visit  No results found for: CHOL, CHOLPOCT, CHOLX, CHLST, CHOLV, TOTCHOLEXT, HDL, HDLPOC, HDLEXT, HDLP, LDL, LDLCPOC, LDLCEXT, LDLC, DLDLP, VLDLC, VLDL, TGLX, TRIGL, TRIGLYCEXT, TRIGP, TGLPOCT, CHHD, CHHDX     RTC 3 months    We discussed the expected course, resolution and complications of the diagnosis(es) in detail. Medication risks, benefits, costs, interactions, and alternatives were discussed as indicated. I advised Marques Krishnamurthy to contact the office if him condition worsens, changes or fails to improve as anticipated. Patient expressed understanding with the diagnosis(es) and plan. Please note that this dictation was completed with [employer], the computer voice recognition software. Quite often unanticipated grammatical, syntax, homophones, and other interpretive errors are inadvertently transcribed by the computer software. Efforts were made to correct these errors in proofreading. Please excuse any errors that have escaped final proofreading. Thank you. Dottie Nelson MD   [facility]    Please see patient instructions.

## 2023-04-12 ENCOUNTER — OFFICE VISIT (OUTPATIENT)
Dept: ENDOCRINOLOGY | Age: 68
End: 2023-04-12
Payer: MEDICARE

## 2023-04-12 VITALS
WEIGHT: 177 LBS | DIASTOLIC BLOOD PRESSURE: 63 MMHG | HEIGHT: 66 IN | SYSTOLIC BLOOD PRESSURE: 122 MMHG | BODY MASS INDEX: 28.45 KG/M2 | HEART RATE: 84 BPM

## 2023-04-12 DIAGNOSIS — E66.3 OVERWEIGHT (BMI 25.0-29.9): ICD-10-CM

## 2023-04-12 DIAGNOSIS — E11.65 TYPE 2 DIABETES MELLITUS WITH HYPERGLYCEMIA, WITHOUT LONG-TERM CURRENT USE OF INSULIN (HCC): Primary | ICD-10-CM

## 2023-04-12 DIAGNOSIS — E78.2 MIXED HYPERLIPIDEMIA: ICD-10-CM

## 2023-04-12 DIAGNOSIS — I10 PRIMARY HYPERTENSION: ICD-10-CM

## 2023-04-12 PROCEDURE — 1101F PT FALLS ASSESS-DOCD LE1/YR: CPT | Performed by: GENERAL ACUTE CARE HOSPITAL

## 2023-04-12 PROCEDURE — 1123F ACP DISCUSS/DSCN MKR DOCD: CPT | Performed by: GENERAL ACUTE CARE HOSPITAL

## 2023-04-12 PROCEDURE — 3078F DIAST BP <80 MM HG: CPT | Performed by: GENERAL ACUTE CARE HOSPITAL

## 2023-04-12 PROCEDURE — 2022F DILAT RTA XM EVC RTNOPTHY: CPT | Performed by: GENERAL ACUTE CARE HOSPITAL

## 2023-04-12 PROCEDURE — G8417 CALC BMI ABV UP PARAM F/U: HCPCS | Performed by: GENERAL ACUTE CARE HOSPITAL

## 2023-04-12 PROCEDURE — G8427 DOCREV CUR MEDS BY ELIG CLIN: HCPCS | Performed by: GENERAL ACUTE CARE HOSPITAL

## 2023-04-12 PROCEDURE — 99214 OFFICE O/P EST MOD 30 MIN: CPT | Performed by: GENERAL ACUTE CARE HOSPITAL

## 2023-04-12 PROCEDURE — 3017F COLORECTAL CA SCREEN DOC REV: CPT | Performed by: GENERAL ACUTE CARE HOSPITAL

## 2023-04-12 PROCEDURE — 83036 HEMOGLOBIN GLYCOSYLATED A1C: CPT | Performed by: GENERAL ACUTE CARE HOSPITAL

## 2023-04-12 PROCEDURE — 3074F SYST BP LT 130 MM HG: CPT | Performed by: GENERAL ACUTE CARE HOSPITAL

## 2023-04-12 PROCEDURE — G8536 NO DOC ELDER MAL SCRN: HCPCS | Performed by: GENERAL ACUTE CARE HOSPITAL

## 2023-04-12 PROCEDURE — 3044F HG A1C LEVEL LT 7.0%: CPT | Performed by: GENERAL ACUTE CARE HOSPITAL

## 2023-04-12 PROCEDURE — G8432 DEP SCR NOT DOC, RNG: HCPCS | Performed by: GENERAL ACUTE CARE HOSPITAL

## 2023-04-12 RX ORDER — GLIPIZIDE 10 MG/1
10 TABLET, FILM COATED, EXTENDED RELEASE ORAL DAILY
Qty: 60 TABLET | Refills: 3 | Status: SHIPPED | OUTPATIENT
Start: 2023-04-12 | End: 2023-04-25 | Stop reason: ALTCHOICE

## 2023-04-24 ENCOUNTER — TELEPHONE (OUTPATIENT)
Dept: ENDOCRINOLOGY | Age: 68
End: 2023-04-24

## 2023-04-24 DIAGNOSIS — E11.65 TYPE 2 DIABETES MELLITUS WITH HYPERGLYCEMIA, WITHOUT LONG-TERM CURRENT USE OF INSULIN (HCC): ICD-10-CM

## 2023-04-24 LAB — HBA1C MFR BLD HPLC: 6.9 %

## 2023-04-24 NOTE — TELEPHONE ENCOUNTER
Please ask him if he has any of the glipizide 10 mg immediate release tabs at his house. If so, he can go back to 1 tab twice daily 5-10 minutes before breakfast and dinner and continue on the jardiance and give Dr. Yola Arriola an update when she returns on 5/1/23. If he needs me to send a supply then please pend to me and I'll take care of this. Thanks.

## 2023-04-24 NOTE — TELEPHONE ENCOUNTER
Pt LVM at 1:50 PM stating Dr Lisseth Ibarra changed his medication to slow release glipizide and since changing his blood glucose has become worse. Mr Kayla San says his fasting blood glucose is between 100-120, one hour after meal it is 250 and 4 hours after a meal it is about 180. Pt is asking what to do. LVM asking pt to confirm DM meds.

## 2023-04-25 RX ORDER — GLIPIZIDE 10 MG/1
10 TABLET ORAL 2 TIMES DAILY
Qty: 180 TABLET | Refills: 3
Start: 2023-04-25

## 2023-04-25 NOTE — TELEPHONE ENCOUNTER
Patient notified of message per Dr. Karine Cervantes and voiced understanding of what was read to them. Pt states he has a 90 day supply of immediate release and will do what has been recommended. Asked pt to call Dr Myranda Hernandez on 05/01/2023 to check in. Pt verbalized understanding.

## 2023-07-12 ENCOUNTER — OFFICE VISIT (OUTPATIENT)
Age: 68
End: 2023-07-12
Payer: MEDICARE

## 2023-07-12 VITALS
HEIGHT: 66 IN | HEART RATE: 72 BPM | SYSTOLIC BLOOD PRESSURE: 132 MMHG | DIASTOLIC BLOOD PRESSURE: 79 MMHG | BODY MASS INDEX: 28.64 KG/M2 | WEIGHT: 178.2 LBS

## 2023-07-12 DIAGNOSIS — E11.65 TYPE 2 DIABETES MELLITUS WITH HYPERGLYCEMIA, WITHOUT LONG-TERM CURRENT USE OF INSULIN (HCC): Primary | ICD-10-CM

## 2023-07-12 LAB — HBA1C MFR BLD: 7.6 %

## 2023-07-12 PROCEDURE — G8427 DOCREV CUR MEDS BY ELIG CLIN: HCPCS | Performed by: GENERAL ACUTE CARE HOSPITAL

## 2023-07-12 PROCEDURE — 2022F DILAT RTA XM EVC RTNOPTHY: CPT | Performed by: GENERAL ACUTE CARE HOSPITAL

## 2023-07-12 PROCEDURE — 1123F ACP DISCUSS/DSCN MKR DOCD: CPT | Performed by: GENERAL ACUTE CARE HOSPITAL

## 2023-07-12 PROCEDURE — 3074F SYST BP LT 130 MM HG: CPT | Performed by: GENERAL ACUTE CARE HOSPITAL

## 2023-07-12 PROCEDURE — 3017F COLORECTAL CA SCREEN DOC REV: CPT | Performed by: GENERAL ACUTE CARE HOSPITAL

## 2023-07-12 PROCEDURE — 1036F TOBACCO NON-USER: CPT | Performed by: GENERAL ACUTE CARE HOSPITAL

## 2023-07-12 PROCEDURE — G8419 CALC BMI OUT NRM PARAM NOF/U: HCPCS | Performed by: GENERAL ACUTE CARE HOSPITAL

## 2023-07-12 PROCEDURE — 3078F DIAST BP <80 MM HG: CPT | Performed by: GENERAL ACUTE CARE HOSPITAL

## 2023-07-12 PROCEDURE — 83036 HEMOGLOBIN GLYCOSYLATED A1C: CPT | Performed by: GENERAL ACUTE CARE HOSPITAL

## 2023-07-12 PROCEDURE — 99214 OFFICE O/P EST MOD 30 MIN: CPT | Performed by: GENERAL ACUTE CARE HOSPITAL

## 2023-07-12 PROCEDURE — 3046F HEMOGLOBIN A1C LEVEL >9.0%: CPT | Performed by: GENERAL ACUTE CARE HOSPITAL

## 2023-07-12 RX ORDER — GLIPIZIDE 10 MG/1
10 TABLET ORAL 2 TIMES DAILY
Qty: 180 TABLET | Refills: 3 | Status: SHIPPED | OUTPATIENT
Start: 2023-07-12

## 2023-07-12 NOTE — PATIENT INSTRUCTIONS
Plan to continue the same:  -Glipizide 10mg twice daily  -Jardiance 25mg daily    Please see Ophthalmology for Diabetic eye exam.

## 2023-07-12 NOTE — PROGRESS NOTES
----- Message from Paco Ornelas MD sent at 7/26/2022  9:33 PM CDT -----  Please notify  of results.  The pathology results demonstrated Tubular adenoma.  Follow up in 5 years. Sooner if new symptoms or change in family history.      REFERRED BY: Mahesh Fisher MD     REASON:  Uncontrolled type 2 diabetes mellitus    CHIEF COMPLAINT: evaluation of type 2 diabetes mellitus    HISTORY OF PRESENT ILLNESS:   Candice Vann is a 76 y.o. male with a PMHx as noted below who presents for evaluation of uncontrolled type 2 diabetes. In the last visit we changed to slow release Glipizide due to hypoglycemia and continued Jardiance 25mg daily. Mr Ernie Nelson however has had significant increase in his blood sugar and feels that the slow release glipizide is not effective for him. Denies hypoglycemia episode since last visit. He has also been monitoring his blood sugar after certain foods and learning more about which spikes his postprandial blood sugars. Continues to follow a diabetic diet and takes average 40g carbs per meal. 2 hours post meal his BS sometimes >315 with certain foods.     Diabetes History:  Diabetes was diagnosed: 2002 officially diagnosed,   1999 PE in the hospital and found with impaired sugars  Family History of diabetes is Positive father DM 2  Last A1c: 7.6% 7/12/2023, 6.9% 04/12/2023; 8.1% 07/2022  7.1  10/21/2022,  in mid Dec 2022 7.3%    Current Home Regimen:  Glipizide SR 10mg 2 tabs daily  Jardiance 25mg daily    Januvia stopped it many yrs ago  Glyburide was on it before it  Metformin d/c 2 yrs ago    Review of home glucose:  AM: <100s  Goes up to 200s after meals sometimes    Hypoglycemia:no    Diet: eating 40-50g/meal  -breakfast 2 black coffee cups, then 1 hr later eats eggs 2x w/ w/o meat, wheat toast, oatmeal 3x/week, occass pancakes with blueberries  -lunch roast beef sandwiches, bean soup  -dinner whatever his wife fixes, chicken tenders over salad, 2 hotdogs with no buns with french fries with apple sauce, 1 starchy veggie, broccoli,   -snacks: cheese, nuts, fresh watermelon for dessert   -lucille: avg 4-5 liters of water, 2x/month soda    Physical Activity:  -sedentary lifestyle, in summer more

## 2023-10-26 ENCOUNTER — OFFICE VISIT (OUTPATIENT)
Age: 68
End: 2023-10-26
Payer: MEDICARE

## 2023-10-26 VITALS
DIASTOLIC BLOOD PRESSURE: 68 MMHG | SYSTOLIC BLOOD PRESSURE: 138 MMHG | WEIGHT: 174.2 LBS | HEIGHT: 67 IN | BODY MASS INDEX: 27.34 KG/M2 | HEART RATE: 56 BPM

## 2023-10-26 DIAGNOSIS — N18.31 CHRONIC KIDNEY DISEASE, STAGE 3A (HCC): ICD-10-CM

## 2023-10-26 DIAGNOSIS — E11.65 TYPE 2 DIABETES MELLITUS WITH HYPERGLYCEMIA, WITHOUT LONG-TERM CURRENT USE OF INSULIN (HCC): Primary | ICD-10-CM

## 2023-10-26 PROCEDURE — G8427 DOCREV CUR MEDS BY ELIG CLIN: HCPCS | Performed by: GENERAL ACUTE CARE HOSPITAL

## 2023-10-26 PROCEDURE — 99214 OFFICE O/P EST MOD 30 MIN: CPT | Performed by: GENERAL ACUTE CARE HOSPITAL

## 2023-10-26 PROCEDURE — 2022F DILAT RTA XM EVC RTNOPTHY: CPT | Performed by: GENERAL ACUTE CARE HOSPITAL

## 2023-10-26 PROCEDURE — 3078F DIAST BP <80 MM HG: CPT | Performed by: GENERAL ACUTE CARE HOSPITAL

## 2023-10-26 PROCEDURE — G8419 CALC BMI OUT NRM PARAM NOF/U: HCPCS | Performed by: GENERAL ACUTE CARE HOSPITAL

## 2023-10-26 PROCEDURE — 3017F COLORECTAL CA SCREEN DOC REV: CPT | Performed by: GENERAL ACUTE CARE HOSPITAL

## 2023-10-26 PROCEDURE — G8484 FLU IMMUNIZE NO ADMIN: HCPCS | Performed by: GENERAL ACUTE CARE HOSPITAL

## 2023-10-26 PROCEDURE — 1123F ACP DISCUSS/DSCN MKR DOCD: CPT | Performed by: GENERAL ACUTE CARE HOSPITAL

## 2023-10-26 PROCEDURE — 3046F HEMOGLOBIN A1C LEVEL >9.0%: CPT | Performed by: GENERAL ACUTE CARE HOSPITAL

## 2023-10-26 PROCEDURE — 3074F SYST BP LT 130 MM HG: CPT | Performed by: GENERAL ACUTE CARE HOSPITAL

## 2023-10-26 PROCEDURE — 1036F TOBACCO NON-USER: CPT | Performed by: GENERAL ACUTE CARE HOSPITAL

## 2023-10-26 NOTE — PATIENT INSTRUCTIONS
Plan to continue the same:  -Glipizide 10mg twice daily  -Jardiance 25mg daily    Please see Ophthalmology for Diabetic eye exam.     Please have the lab results faxed to [phone].

## 2023-10-26 NOTE — PROGRESS NOTES
REFERRED BY: Belén Villegas MD     REASON:  Uncontrolled type 2 diabetes mellitus    CHIEF COMPLAINT: evaluation of type 2 diabetes mellitus    HISTORY OF PRESENT ILLNESS:   Augustin  is a 76 y.o. male with a PMHx as noted below who presents for evaluation of uncontrolled type 2 diabetes. 10/26/23  Mr Matt Marshall denies new complaints today. He had URTI and his blood sugar was more elevated during that time. Feels since he switched from extended release back to regular glipizide has improved blood sugar control. 7/12/2023  In the last visit we changed to slow release Glipizide due to hypoglycemia and continued Jardiance 25mg daily. Mr Matt Marshall however has had significant increase in his blood sugar and feels that the slow release glipizide is not effective for him. Denies hypoglycemia episode since last visit. He has also been monitoring his blood sugar after certain foods and learning more about which spikes his postprandial blood sugars. Continues to follow a diabetic diet and takes average 40g carbs per meal. 2 hours post meal his BS sometimes >474 with certain foods.     Diabetes History:  Diabetes was diagnosed: 2002 officially diagnosed,   1999 PE in the hospital and found with impaired sugars  Family History of diabetes is Positive father DM 2  Last A1c: 7.4% 10/26/23 ,  7.6% 7/12/2023, 6.9% 04/12/2023; 8.1% 07/2022  7.1  10/21/2022,  in mid Dec 2022 7.3%    Current Home Regimen:  Glipizide SR 10mg 2 tabs daily  Jardiance 25mg daily    Januvia stopped it many yrs ago  Glyburide was on it before it  Metformin d/c 2 yrs ago    Review of home glucose:  AM: <100s    Hypoglycemia:no    Diet: eating 40-50g/meal  -breakfast 2 black coffee cups, then 1 hr later eats eggs 2x w/ w/o meat, wheat toast, oatmeal 3x/week, occass pancakes with blueberries  -lunch roast beef sandwiches, bean soup  -dinner whatever his wife fixes, chicken tenders over salad, 2 hotdogs with no buns with french fries with apple

## 2023-10-29 PROBLEM — N18.31 CHRONIC KIDNEY DISEASE, STAGE 3A (HCC): Status: ACTIVE | Noted: 2023-10-29

## 2024-04-29 ENCOUNTER — OFFICE VISIT (OUTPATIENT)
Age: 69
End: 2024-04-29
Payer: MEDICARE

## 2024-04-29 VITALS
HEART RATE: 81 BPM | BODY MASS INDEX: 27.15 KG/M2 | OXYGEN SATURATION: 96 % | SYSTOLIC BLOOD PRESSURE: 118 MMHG | HEIGHT: 67 IN | RESPIRATION RATE: 16 BRPM | DIASTOLIC BLOOD PRESSURE: 66 MMHG | WEIGHT: 173 LBS

## 2024-04-29 DIAGNOSIS — E11.22 TYPE 2 DIABETES MELLITUS WITH STAGE 3A CHRONIC KIDNEY DISEASE, WITHOUT LONG-TERM CURRENT USE OF INSULIN (HCC): Primary | ICD-10-CM

## 2024-04-29 DIAGNOSIS — E66.3 OVERWEIGHT: ICD-10-CM

## 2024-04-29 DIAGNOSIS — N18.31 TYPE 2 DIABETES MELLITUS WITH STAGE 3A CHRONIC KIDNEY DISEASE, WITHOUT LONG-TERM CURRENT USE OF INSULIN (HCC): Primary | ICD-10-CM

## 2024-04-29 LAB — HBA1C MFR BLD: 6.9 %

## 2024-04-29 PROCEDURE — 99213 OFFICE O/P EST LOW 20 MIN: CPT | Performed by: GENERAL ACUTE CARE HOSPITAL

## 2024-04-29 PROCEDURE — G8427 DOCREV CUR MEDS BY ELIG CLIN: HCPCS | Performed by: GENERAL ACUTE CARE HOSPITAL

## 2024-04-29 PROCEDURE — 3078F DIAST BP <80 MM HG: CPT | Performed by: GENERAL ACUTE CARE HOSPITAL

## 2024-04-29 PROCEDURE — 3074F SYST BP LT 130 MM HG: CPT | Performed by: GENERAL ACUTE CARE HOSPITAL

## 2024-04-29 PROCEDURE — 3046F HEMOGLOBIN A1C LEVEL >9.0%: CPT | Performed by: GENERAL ACUTE CARE HOSPITAL

## 2024-04-29 PROCEDURE — 1036F TOBACCO NON-USER: CPT | Performed by: GENERAL ACUTE CARE HOSPITAL

## 2024-04-29 PROCEDURE — 2022F DILAT RTA XM EVC RTNOPTHY: CPT | Performed by: GENERAL ACUTE CARE HOSPITAL

## 2024-04-29 PROCEDURE — 83036 HEMOGLOBIN GLYCOSYLATED A1C: CPT | Performed by: GENERAL ACUTE CARE HOSPITAL

## 2024-04-29 PROCEDURE — 3017F COLORECTAL CA SCREEN DOC REV: CPT | Performed by: GENERAL ACUTE CARE HOSPITAL

## 2024-04-29 PROCEDURE — 1123F ACP DISCUSS/DSCN MKR DOCD: CPT | Performed by: GENERAL ACUTE CARE HOSPITAL

## 2024-04-29 PROCEDURE — G8419 CALC BMI OUT NRM PARAM NOF/U: HCPCS | Performed by: GENERAL ACUTE CARE HOSPITAL

## 2024-04-29 NOTE — PROGRESS NOTES
[facility]  DR VILMA PERRY      ASSESSMENT AND PLAN:     Type 2 diabetes, well controlled   Complications Retinopathy, CKD 3a, Neuropathy  Hyperlipidemia  Hypertension    Doing very well on current regimen we will continue the same  Receives Jardiance through prescription through [employer] supplier    Medications:     The slow release glipizide formulation did not work for mr Arriaga and has done better on regular glipizide. Will continue the same for now.     Plan:  Continue Jardiance 25mg daily and Glipizide 10 mg twice daily    Check the fasting blood sugar    GLP-1 agonists not option per patient due to cost    Advised to check glucose 1x/day  Annual Ophthalm encouraged to attend his annual appointment, risk of not completing annual ophthalm visit discussed, Regular foot self checks and regular dental care    HTN: well controlled, on lisinopril 20mg, BP management deferred to Nephrology     HLD: Fasting lipids to be reviewed, LDL 62 on rosuva 20, fenofibrate dose reduced per patient by his nephrologist, following with PCP for that  Overweight: He is sticking to healthy lifestyle modif, BMI 27, will monitor    Advised to have recent labs faxed to our office from PCP or to bring a copy in the upcoming visit    RTC 3 months    01/2024    Triglycerides 273  Hemoglobin A1c 7.6%    03/2022  LDL 62    Chol 148  HDL 46  ---------------------------------------------------------------------------------------------------------------------------------     HISTORY OF PRESENT ILLNESS:   Bart Quezada is a 68 y.o. male with a PMHx as noted below who presents for evaluation of uncontrolled type 2 diabetes.    4/29/24   Mr Arriaga indicated left foot cramp and intermittent cramps in his LE and he went on 2 fishing trips recently and feels that may have contributed, no gait issues, gross motor and sensory without changes, blood sugar control well, morning fasting blood sugar ,

## 2024-04-29 NOTE — PATIENT INSTRUCTIONS
Keep up the good work!    Plan to continue the same:  -Glipizide 10mg twice daily  -Jardiance 25mg daily    Please see Ophthalmology for Diabetic eye exam and [phone].

## 2024-04-29 NOTE — PROGRESS NOTES
Identified pt with two pt identifiers (name and ). Reviewed chart in preparation for visit and have obtained necessary documentation.    Bart Quezada is a 68 y.o. male  Chief Complaint   Patient presents with    Diabetes     6 month      /66 (Site: Right Upper Arm, Position: Sitting, Cuff Size: Small Adult)   Pulse 81   Resp 16   Ht 1.702 m (5' 7\")   Wt 78.5 kg (173 lb)   SpO2 96%   BMI 27.10 kg/m²     1. Have you been to the ER, urgent care clinic since your last visit?  Hospitalized since your last visit?no    2. Have you seen or consulted any other health care providers outside of the [facility] since your last visit?  Include any pap smears or colon screening. No

## 2024-05-09 PROBLEM — E11.22 TYPE 2 DIABETES MELLITUS WITH STAGE 3A CHRONIC KIDNEY DISEASE, WITHOUT LONG-TERM CURRENT USE OF INSULIN (HCC): Status: ACTIVE | Noted: 2022-07-30

## 2024-05-09 PROBLEM — N18.31 TYPE 2 DIABETES MELLITUS WITH STAGE 3A CHRONIC KIDNEY DISEASE, WITHOUT LONG-TERM CURRENT USE OF INSULIN (HCC): Status: ACTIVE | Noted: 2022-07-30

## 2024-07-16 RX ORDER — GLIPIZIDE 10 MG/1
10 TABLET ORAL 2 TIMES DAILY
Qty: 180 TABLET | Refills: 3 | Status: SHIPPED | OUTPATIENT
Start: 2024-07-16